# Patient Record
Sex: FEMALE | Race: WHITE | Employment: FULL TIME | URBAN - METROPOLITAN AREA
[De-identification: names, ages, dates, MRNs, and addresses within clinical notes are randomized per-mention and may not be internally consistent; named-entity substitution may affect disease eponyms.]

---

## 2018-10-22 ENCOUNTER — OFFICE VISIT (OUTPATIENT)
Dept: NEUROLOGY | Age: 38
End: 2018-10-22

## 2018-10-22 VITALS
DIASTOLIC BLOOD PRESSURE: 78 MMHG | HEIGHT: 69 IN | OXYGEN SATURATION: 98 % | HEART RATE: 69 BPM | SYSTOLIC BLOOD PRESSURE: 122 MMHG | BODY MASS INDEX: 26.07 KG/M2 | WEIGHT: 176 LBS

## 2018-10-22 DIAGNOSIS — M54.12 CERVICAL RADICULOPATHY: Primary | ICD-10-CM

## 2018-10-22 PROBLEM — R20.0 NUMBNESS OF RIGHT HAND: Chronic | Status: ACTIVE | Noted: 2018-10-22

## 2018-10-22 RX ORDER — LORAZEPAM 1 MG/1
1 TABLET ORAL ONCE
Qty: 2 TAB | Refills: 0 | Status: SHIPPED | OUTPATIENT
Start: 2018-10-22 | End: 2018-10-22

## 2018-10-22 RX ORDER — MIDODRINE HYDROCHLORIDE 5 MG/1
TABLET ORAL 3 TIMES DAILY
COMMUNITY

## 2018-10-22 NOTE — PROGRESS NOTES
NEUROLOGY CLINIC NOTE Patient ID: 
Breanna Gaffney [id number] 
45 y.o. 
1980 Date of Consultation:  October 22, 2018 Referring Physician: Dr. Mayra Campoverde Reason for Consultation:  Right hand numbness and weakness Chief Complaint Patient presents with  New Patient Weakness right hand/numbness History of Present Illness:  
 
Patient Active Problem List  
Diagnosis Code  Numbness of right hand R20.0 Past Medical History:  
Diagnosis Date  Headache  Hypotension  Neurological disorder Past Surgical History:  
Procedure Laterality Date  HX ORTHOPAEDIC    
 bunion removed left foot; tumor removal right shoulder  HX TUBAL LIGATION Prior to Admission medications Medication Sig Start Date End Date Taking? Authorizing Provider  
midodrine (PROAMITINE) 5 mg tablet Take  by mouth three (3) times daily. Yes Provider, Historical  
 
Allergies Allergen Reactions  Oxycodone Hives Social History Socioeconomic History  Marital status:  Spouse name: Not on file  Number of children: Not on file  Years of education: Not on file  Highest education level: Not on file Social Needs  Financial resource strain: Not on file  Food insecurity - worry: Not on file  Food insecurity - inability: Not on file  Transportation needs - medical: Not on file  Transportation needs - non-medical: Not on file Occupational History  Not on file Tobacco Use  Smoking status: Current Some Day Smoker Packs/day: 0.50 Years: 20.00 Pack years: 10.00  Smokeless tobacco: Never Used Substance and Sexual Activity  Alcohol use: No  
  Frequency: Never  Drug use: No  
 Sexual activity: Not on file Other Topics Concern  Not on file Social History Narrative  Not on file Family History Problem Relation Age of Onset  Cancer Mother  Headache Mother  Migraines Mother Subjective: Snaa Samayoa is a 45 y.o. RHWF with history hypotension and is on Midodrine (followed by Dr. Newton Villarreal - cardiologist) who was referred by Dr. Heydi Barney for further evaluation of her right hand issue. Per patient condition is been ongoing for about a year. She was having numbness right index and middle finger numbness that was constant. Not a pain but discomfort. Associated with dropping things she was holding with her right hand. Discomfort would also radiate up to the elbow but nothing in the right upper arm or shoulder. Some neck pain and stiffness. No known precipitant, worsening or relieving factors. She saw Dr. Lady Levin 01/2018 and was given cortisone shots to the right carpal tunnel and nighttime wrist splint. This offered relief of the numbness but not the weakness. It was better for 6 months and then it recurred. She underwent EMG/NCS of the right upper extremity done 9/5/2018 was normal.  No evidence of right carpal tunnel syndrome, ulnar neuropathy, polyneuropathy or radiculopathy. Per patient no needle EMG was done on her cervical paraspinal muscles. Clinic visit with Dr. Ольга Kaur of orthopedics dated 9/6/2018 was reviewed. Note mentions intermittent numbness and tingling of the right hand localized to the dorsal lateral aspect of the hand and elbow tightness for approximately 1 year. 6 months relief after cortisone injection at the right carpal tunnel. Issues mostly during the day. Exam revealed Phalen's test and carpal tunnel compression test was positive on the right. Positive Tinel's on the right wrist.  Diagnosed with carpal tunnel syndrome on the right and tenosynovitis right hand. Patient was told approximately 10% of patients with carpal tunnel syndrome have negative EMG studies. Discussed treatment options and patient does not wish to undergo surgical intervention. Conservative management with nighttime wrist splints was ordered. Decrease index and middle finger right cold and PP Outside reports reviewed: office notes, EMG/NCS. Review of Systems: A comprehensive review of systems was performed:  
Constitutional: positive for fatigue Eyes: positive for none Ears, nose, mouth, throat, and face: positive for snoring Respiratory: positive for none Cardiovascular: positive for skipped beats Gastrointestinal: positive for abdominal pain Genitourinary: positive for none Integument/breast: positive for none Hematologic/lymphatic: positive for none Musculoskeletal: positive for joint pain Neurological: positive for headache, numbness Behavioral/Psych: positive for none Endocrine: positive for none Allergic/Immunologic: positive for none Objective:  
 
Visit Vitals /78 Pulse 69 Ht 5' 9\" (1.753 m) Wt 176 lb (79.8 kg) SpO2 98% BMI 25.99 kg/m² 4/10 hand pain PHYSICAL EXAM: 
 
General Appearance: Alert, patient appears stated age. General:  Well developed, well nourished patent in no apparent distress. Head/Face: The head is normocephalic and atraumatic. Eyes: Conjunctivae appear normal. Sclera appear normal and non-icteric. Nose (and Sinus):   No abnormality of the nose or sinuses is noted. Oral:   Throat is clear. Lymphatics:  No lymphadenopathy in the neck/head. Neck and Thyroid:   No bruits noted in the neck. Respiratory:  Lungs clear to auscultation. Cardiovascular:  Palpation and auscultation: regular rate and rhythm. Extremity: No joint swelling, erythema or pedal edema. NEUROLOGICAL EXAM: 
 
Appearance: The patient is well developed, well nourished, provides a coherent history and is in no acute distress. Mental Status: Oriented to time, place and person. Fluent, no aphasia or dysarthria. Mood and affect appropriate. Cranial Nerves:   Intact visual fields. Fundi are benign. Pupils 4 mm right and 3 mm left and both briskly reactive to light.  EOM's full, no nystagmus, no ptosis. Facial sensation is normal. Corneal reflexes are intact. Facial movement is symmetric. Hearing is normal bilaterally. Palate is midline with normal elevation. Sternocleidomastoid and trapezius muscles are normal. Tongue is midline. Motor:  5/5 strength except mild weakness right hand intrinsic muscles. Normal bulk and tone. No fasciculations. No pronator drift. Reflexes:   Deep tendon reflexes 3+/4 and symmetrical. Downgoing toes. Sensory:   Decrease cold and PP right 2nd and 3rd fingers. (+) Tinel's bilateral wrist and elbow Gait:  Normal gait. No Romberg. Can do tandem walking. Tremor:   No tremor noted. Cerebellar:  Intact FTN/CORTEZ/HTS. Neurovascular:  Normal heart sounds and regular rhythm, peripheral pulses intact, and no carotid bruits. Assessment:  
Cervical radiculopathy Plan:  
Neurological examination reveals decreased sensation right second and third finger with hand intrinsic muscle weakness, Tinel's at both the wrist and elbow, hyperreflexia with downgoing toes. EMG/NCS of the right upper extremity did not reveal any entrapment neuropathy or generalized neuropathy. Need to consider cervical radiculopathy likely at C6 +/- cervical spinal stenosis or myelopathy. Discussed need to do further diagnostic testing. Patient agreed. Cervical MRI without contrast was ordered. Further intervention will be done pending results of testing. No indication to do right carpal tunnel release surgery at this time in my opinion. All questions and concerns were answered. Visit lasted 60 minutes. Greater than greater than 50% was spent discussing her condition, potential etiology, prognosis, review of medical records from outside sources including orthopedic visit and EMG/NCS of right upper extremity, need for cervical MRI to look for cervical radiculopathy or cervical spinal stenosis, no indication to do any carpal tunnel release surgery at this time

## 2018-10-22 NOTE — PATIENT INSTRUCTIONS
A Healthy Lifestyle: Care Instructions Your Care Instructions A healthy lifestyle can help you feel good, stay at a healthy weight, and have plenty of energy for both work and play. A healthy lifestyle is something you can share with your whole family. A healthy lifestyle also can lower your risk for serious health problems, such as high blood pressure, heart disease, and diabetes. You can follow a few steps listed below to improve your health and the health of your family. Follow-up care is a key part of your treatment and safety. Be sure to make and go to all appointments, and call your doctor if you are having problems. It's also a good idea to know your test results and keep a list of the medicines you take. How can you care for yourself at home? · Do not eat too much sugar, fat, or fast foods. You can still have dessert and treats now and then. The goal is moderation. · Start small to improve your eating habits. Pay attention to portion sizes, drink less juice and soda pop, and eat more fruits and vegetables. ? Eat a healthy amount of food. A 3-ounce serving of meat, for example, is about the size of a deck of cards. Fill the rest of your plate with vegetables and whole grains. ? Limit the amount of soda and sports drinks you have every day. Drink more water when you are thirsty. ? Eat at least 5 servings of fruits and vegetables every day. It may seem like a lot, but it is not hard to reach this goal. A serving or helping is 1 piece of fruit, 1 cup of vegetables, or 2 cups of leafy, raw vegetables. Have an apple or some carrot sticks as an afternoon snack instead of a candy bar. Try to have fruits and/or vegetables at every meal. 
· Make exercise part of your daily routine. You may want to start with simple activities, such as walking, bicycling, or slow swimming. Try to be active 30 to 60 minutes every day.  You do not need to do all 30 to 60 minutes all at once. For example, you can exercise 3 times a day for 10 or 20 minutes. Moderate exercise is safe for most people, but it is always a good idea to talk to your doctor before starting an exercise program. 
· Keep moving. Janes Loza the lawn, work in the garden, or [employer]. Take the stairs instead of the elevator at work. · If you smoke, quit. People who smoke have an increased risk for heart attack, stroke, cancer, and other lung illnesses. Quitting is hard, but there are ways to boost your chance of quitting tobacco for good. ? Use nicotine gum, patches, or lozenges. ? Ask your doctor about stop-smoking programs and medicines. ? Keep trying. In addition to reducing your risk of diseases in the future, you will notice some benefits soon after you stop using tobacco. If you have shortness of breath or asthma symptoms, they will likely get better within a few weeks after you quit. · Limit how much alcohol you drink. Moderate amounts of alcohol (up to 2 drinks a day for men, 1 drink a day for women) are okay. But drinking too much can lead to liver problems, high blood pressure, and other health problems. Family health If you have a family, there are many things you can do together to improve your health. · Eat meals together as a family as often as possible. · Eat healthy foods. This includes fruits, vegetables, lean meats and dairy, and whole grains. · Include your family in your fitness plan. Most people think of activities such as jogging or tennis as the way to fitness, but there are many ways you and your family can be more active. Anything that makes you breathe hard and gets your heart pumping is exercise. Here are some tips: 
? Walk to do errands or to take your child to school or the bus. 
? Go for a family bike ride after dinner instead of watching TV. Where can you learn more? Go to http://erica-dayanna.info/. Enter H076 in the search box to learn more about \"A Healthy Lifestyle: Care Instructions. \" Current as of: December 7, 2017 Content Version: 11.8 © 5608-3451 [employer]. Care instructions adapted under license by [employer] (which disclaims liability or warranty for this information). If you have questions about a medical condition or this instruction, always ask your healthcare professional. [address] any warranty or liability for your use of this information. A Healthy Lifestyle: Care Instructions Your Care Instructions A healthy lifestyle can help you feel good, stay at a healthy weight, and have plenty of energy for both work and play. A healthy lifestyle is something you can share with your whole family. A healthy lifestyle also can lower your risk for serious health problems, such as high blood pressure, heart disease, and diabetes. You can follow a few steps listed below to improve your health and the health of your family. Follow-up care is a key part of your treatment and safety. Be sure to make and go to all appointments, and call your doctor if you are having problems. It's also a good idea to know your test results and keep a list of the medicines you take. How can you care for yourself at home? · Do not eat too much sugar, fat, or fast foods. You can still have dessert and treats now and then. The goal is moderation. · Start small to improve your eating habits. Pay attention to portion sizes, drink less juice and soda pop, and eat more fruits and vegetables. ? Eat a healthy amount of food. A 3-ounce serving of meat, for example, is about the size of a deck of cards. Fill the rest of your plate with vegetables and whole grains. ? Limit the amount of soda and sports drinks you have every day. Drink more water when you are thirsty. ? Eat at least 5 servings of fruits and vegetables every day.  It may seem like a lot, but it is not hard to reach this goal. A serving or helping is 1 piece of fruit, 1 cup of vegetables, or 2 cups of leafy, raw vegetables. Have an apple or some carrot sticks as an afternoon snack instead of a candy bar. Try to have fruits and/or vegetables at every meal. 
· Make exercise part of your daily routine. You may want to start with simple activities, such as walking, bicycling, or slow swimming. Try to be active 30 to 60 minutes every day. You do not need to do all 30 to 60 minutes all at once. For example, you can exercise 3 times a day for 10 or 20 minutes. Moderate exercise is safe for most people, but it is always a good idea to talk to your doctor before starting an exercise program. 
· Keep moving. Boris Seamen the lawn, work in the garden, or [employer]. Take the stairs instead of the elevator at work. · If you smoke, quit. People who smoke have an increased risk for heart attack, stroke, cancer, and other lung illnesses. Quitting is hard, but there are ways to boost your chance of quitting tobacco for good. ? Use nicotine gum, patches, or lozenges. ? Ask your doctor about stop-smoking programs and medicines. ? Keep trying. In addition to reducing your risk of diseases in the future, you will notice some benefits soon after you stop using tobacco. If you have shortness of breath or asthma symptoms, they will likely get better within a few weeks after you quit. · Limit how much alcohol you drink. Moderate amounts of alcohol (up to 2 drinks a day for men, 1 drink a day for women) are okay. But drinking too much can lead to liver problems, high blood pressure, and other health problems. Family health If you have a family, there are many things you can do together to improve your health. · Eat meals together as a family as often as possible. · Eat healthy foods. This includes fruits, vegetables, lean meats and dairy, and whole grains. · Include your family in your fitness plan. Most people think of activities such as jogging or tennis as the way to fitness, but there are many ways you and your family can be more active. Anything that makes you breathe hard and gets your heart pumping is exercise. Here are some tips: 
? Walk to do errands or to take your child to school or the bus. 
? Go for a family bike ride after dinner instead of watching TV. Where can you learn more? Go to http://erica-dayanna.info/. Enter T564 in the search box to learn more about \"A Healthy Lifestyle: Care Instructions. \" Current as of: December 7, 2017 Content Version: 11.8 © 4634-6839 [employer]. Care instructions adapted under license by [employer] (which disclaims liability or warranty for this information). If you have questions about a medical condition or this instruction, always ask your healthcare professional. [place] [address] any warranty or liability for your use of this information. Please be advised there is a $25 fee for all paperwork to be completed from our  providers. This is to be paid by the patient prior to picking up the completed forms. Pinched Nerve in the Neck: Care Instructions Your Care Instructions A pinched nerve in the neck happens when a vertebra or disc in the upper part of your spine is damaged. This damage can happen because of an injury. Or it can just happen with age. The changes caused by the damage may put pressure on a nearby nerve root, pinching it. This causes symptoms such as sharp pain in your neck, shoulder, arm, hand, or back. You may also have tingling or numbness. Sometimes it makes your arm weaker. The symptoms are usually worse when you turn your head or strain your neck. For many people, the symptoms get better over time and finally go away. Early treatment usually includes medicines for pain and swelling. Sometimes physical therapy and special exercises may help. Follow-up care is a key part of your treatment and safety. Be sure to make and go to all appointments, and call your doctor if you are having problems. It's also a good idea to know your test results and keep a list of the medicines you take. How can you care for yourself at home? · Be safe with medicines. Read and follow all instructions on the label. ? If the doctor gave you a prescription medicine for pain, take it as prescribed. ? If you are not taking a prescription pain medicine, ask your doctor if you can take an over-the-counter medicine. · Try using a heating pad on a low or medium setting for 15 to 20 minutes every 2 or 3 hours. Try a warm shower in place of one session with the heating pad. You can also buy single-use heat wraps that last up to 8 hours. · You can also try an ice pack for 10 to 15 minutes every 2 to 3 hours. There isn't strong evidence that either heat or ice will help. But you can try them to see if they help you. · Don't spend too long in one position. Take short breaks to move around and change positions. · Wear a seat belt and shoulder harness when you are in a car. · Sleep with a pillow under your head and neck that keeps your neck straight. · If you were given a neck brace (cervical collar) to limit neck motion, wear it as instructed for as many days as your doctor tells you to. Do not wear it longer than you were told to. Wearing a brace for too long can lead to neck stiffness and can weaken the neck muscles. · Follow your doctor's instructions for gentle neck-stretching exercises. · Do not smoke. Smoking can slow healing of your discs. If you need help quitting, talk to your doctor about stop-smoking programs and medicines. These can increase your chances of quitting for good. · Avoid strenuous work or exercise until your doctor says it is okay. When should you call for help? Call 911 anytime you think you may need emergency care. For example, call if: 
  · You are unable to move an arm or a leg at all.  
[facility] your doctor now or seek immediate medical care if: 
  · You have new or worse symptoms in your arms, legs, chest, belly, or buttocks. Symptoms may include: 
? Numbness or tingling. ? Weakness. ? Pain.  
  · You lose bladder or bowel control.  
 Watch closely for changes in your health, and be sure to contact your doctor if: 
  · You are not getting better as expected. Where can you learn more? Go to http://erica-dayanna.info/. Enter U724 in the search box to learn more about \"Pinched Nerve in the Neck: Care Instructions. \" Current as of: November 29, 2017 Content Version: 11.8 © 4855-5457 [employer]. Care instructions adapted under license by [employer] (which disclaims liability or warranty for this information). If you have questions about a medical condition or this instruction, always ask your healthcare professional. [place] [address] any warranty or liability for your use of this information.

## 2018-11-21 ENCOUNTER — HOSPITAL ENCOUNTER (OUTPATIENT)
Dept: MRI IMAGING | Age: 38
Discharge: HOME | End: 2018-11-21
Attending: PSYCHIATRY & NEUROLOGY
Payer: COMMERCIAL

## 2018-11-21 DIAGNOSIS — M54.12 CERVICAL RADICULOPATHY: ICD-10-CM

## 2018-11-21 PROCEDURE — 72141 MRI NECK SPINE W/O DYE: CPT

## 2018-11-29 ENCOUNTER — OFFICE VISIT (OUTPATIENT)
Dept: NEUROLOGY | Age: 38
End: 2018-11-29

## 2018-11-29 VITALS
OXYGEN SATURATION: 97 % | DIASTOLIC BLOOD PRESSURE: 90 MMHG | HEART RATE: 67 BPM | HEIGHT: 69 IN | SYSTOLIC BLOOD PRESSURE: 110 MMHG | BODY MASS INDEX: 26.22 KG/M2 | WEIGHT: 177 LBS

## 2018-11-29 DIAGNOSIS — M54.2 CERVICALGIA: Primary | ICD-10-CM

## 2018-11-29 DIAGNOSIS — R20.2 RIGHT HAND PARESTHESIA: ICD-10-CM

## 2018-11-29 NOTE — PATIENT INSTRUCTIONS
Office Policies  o Phone calls/patient messages:  Please allow up to 24 hours for someone in the office to contact you about your call or message. Be mindful your provider may be out of the office or your message may require further review. We encourage you to use [employer] for your messages as this is a faster, more efficient way to communicate with our office  o Medication Refills:  Prescription medications require up to 48 business hours to process. We encourage you to use [employer] for your refills. For controlled medications: Please allow up to 72 business hours to process. Certain medications may require you to  a written prescription at our office. NO narcotic/controlled medications will be prescribed after 4pm Monday through Friday or on weekends  o Form/Paperwork Completion:  Please note there is a $25 fee for all paperwork completed by our providers. We ask that you allow 7-14 business days. Pre-payment is due prior to picking up/faxing the completed form. You may also download your forms to [employer] to have your doctor print off.  o Test Results: In order for a patient to obtain test results, an appointment must be made with the physician to review the results. Test results cannot be discussed over the phone. A Healthy Lifestyle: Care Instructions  Your Care Instructions    A healthy lifestyle can help you feel good, stay at a healthy weight, and have plenty of energy for both work and play. A healthy lifestyle is something you can share with your whole family. A healthy lifestyle also can lower your risk for serious health problems, such as high blood pressure, heart disease, and diabetes. You can follow a few steps listed below to improve your health and the health of your family. Follow-up care is a key part of your treatment and safety. Be sure to make and go to all appointments, and call your doctor if you are having problems.  It's also a good idea to know your test results and keep a list of the medicines you take. How can you care for yourself at home? · Do not eat too much sugar, fat, or fast foods. You can still have dessert and treats now and then. The goal is moderation. · Start small to improve your eating habits. Pay attention to portion sizes, drink less juice and soda pop, and eat more fruits and vegetables. ? Eat a healthy amount of food. A 3-ounce serving of meat, for example, is about the size of a deck of cards. Fill the rest of your plate with vegetables and whole grains. ? Limit the amount of soda and sports drinks you have every day. Drink more water when you are thirsty. ? Eat at least 5 servings of fruits and vegetables every day. It may seem like a lot, but it is not hard to reach this goal. A serving or helping is 1 piece of fruit, 1 cup of vegetables, or 2 cups of leafy, raw vegetables. Have an apple or some carrot sticks as an afternoon snack instead of a candy bar. Try to have fruits and/or vegetables at every meal.  · Make exercise part of your daily routine. You may want to start with simple activities, such as walking, bicycling, or slow swimming. Try to be active 30 to 60 minutes every day. You do not need to do all 30 to 60 minutes all at once. For example, you can exercise 3 times a day for 10 or 20 minutes. Moderate exercise is safe for most people, but it is always a good idea to talk to your doctor before starting an exercise program.  · Keep moving. [place] the lawn, work in the garden, or [employer]. Take the stairs instead of the elevator at work. · If you smoke, quit. People who smoke have an increased risk for heart attack, stroke, cancer, and other lung illnesses. Quitting is hard, but there are ways to boost your chance of quitting tobacco for good. ? Use nicotine gum, patches, or lozenges. ? Ask your doctor about stop-smoking programs and medicines. ? Keep trying.   In addition to reducing your risk of diseases in the future, you will notice some benefits soon after you stop using tobacco. If you have shortness of breath or asthma symptoms, they will likely get better within a few weeks after you quit. · Limit how much alcohol you drink. Moderate amounts of alcohol (up to 2 drinks a day for men, 1 drink a day for women) are okay. But drinking too much can lead to liver problems, high blood pressure, and other health problems. Family health  If you have a family, there are many things you can do together to improve your health. · Eat meals together as a family as often as possible. · Eat healthy foods. This includes fruits, vegetables, lean meats and dairy, and whole grains. · Include your family in your fitness plan. Most people think of activities such as jogging or tennis as the way to fitness, but there are many ways you and your family can be more active. Anything that makes you breathe hard and gets your heart pumping is exercise. Here are some tips:  ? Walk to do errands or to take your child to school or the bus.  ? Go for a family bike ride after dinner instead of watching TV. Where can you learn more? Go to http://ericaSMS GupShupdayanna.info/. Enter D465 in the search box to learn more about \"A Healthy Lifestyle: Care Instructions. \"  Current as of: December 7, 2017  Content Version: 11.8  © 1391-7264 [employer]. Care instructions adapted under license by [employer] (which disclaims liability or warranty for this information). If you have questions about a medical condition or this instruction, always ask your healthcare professional. [place] [address] any warranty or liability for your use of this information. Neck: Exercises  Your Care Instructions  Here are some examples of typical rehabilitation exercises for your condition. Start each exercise slowly. Ease off the exercise if you start to have pain.   Your doctor or physical therapist will tell you when you can start these exercises and which ones will work best for you. How to do the exercises  Neck stretch    1. This stretch works best if you keep your shoulder down as you lean away from it. To help you remember to do this, start by relaxing your shoulders and lightly holding on to your thighs or your chair. 2. Tilt your head toward your shoulder and hold for 15 to 30 seconds. Let the weight of your head stretch your muscles. 3. If you would like a little added stretch, use your hand to gently and steadily pull your head toward your shoulder. For example, keeping your right shoulder down, lean your head to the left. 4. Repeat 2 to 4 times toward each shoulder. Diagonal neck stretch    1. Turn your head slightly toward the direction you will be stretching, and tilt your head diagonally toward your chest and hold for 15 to 30 seconds. 2. If you would like a little added stretch, use your hand to gently and steadily pull your head forward on the diagonal.  3. Repeat 2 to 4 times toward each side. Dorsal glide stretch    1. Sit or stand tall and look straight ahead. 2. Slowly tuck your chin as you glide your head backward over your body  3. Hold for a count of 6, and then relax for up to 10 seconds. 4. Repeat 8 to 12 times. Chest and shoulder stretch    1. Sit or stand tall and glide your head backward as in the dorsal glide stretch. 2. Raise both arms so that your hands are next to your ears. 3. Take a deep breath, and as you breathe out, lower your elbows down and behind your back. You will feel your shoulder blades slide down and together, and at the same time you will feel a stretch across your chest and the front of your shoulders. 4. Hold for about 6 seconds, and then relax for up to 10 seconds. 5. Repeat 8 to 12 times. Strengthening: Hands on head    1. Move your head backward, forward, and side to side against gentle pressure from your hands, holding each position for about 6 seconds.   2. Repeat 8 to 12 times.    Follow-up care is a key part of your treatment and safety. Be sure to make and go to all appointments, and call your doctor if you are having problems. It's also a good idea to know your test results and keep a list of the medicines you take. Where can you learn more? Go to http://erica-dayanna.info/. Enter P975 in the search box to learn more about \"Neck: Exercises. \"  Current as of: November 29, 2017  Content Version: 11.8  © 3587-8385 [employer]. Care instructions adapted under license by [employer] (which disclaims liability or warranty for this information). If you have questions about a medical condition or this instruction, always ask your healthcare professional. [address] any warranty or liability for your use of this information.

## 2018-11-29 NOTE — PROGRESS NOTES
NEUROLOGY CLINIC NOTE    Patient ID:  Sana Samayoa  [id number]  77 y.o.  1980    Date of Visit:  November 29, 2018    Referring Physician: Dr. Heydi Barney    Reason for Visit:  Right hand numbness and weakness    Chief Complaint   Patient presents with    Follow-up      cervical radiculapthy       History of Present Illness:     Patient Active Problem List   Diagnosis Code    Numbness of right hand R20.0     Past Medical History:   Diagnosis Date    Headache     Hypotension     Neurological disorder      Past Surgical History:   Procedure Laterality Date    HX ORTHOPAEDIC      bunion removed left foot; tumor removal right shoulder    HX TUBAL LIGATION         Prior to Admission medications    Medication Sig Start Date End Date Taking? Authorizing Provider   midodrine (PROAMITINE) 5 mg tablet Take  by mouth three (3) times daily.    Yes Provider, Historical     Allergies   Allergen Reactions    Hydrocodone Hives    Oxycodone Hives and Shortness of Breath     Social History     Socioeconomic History    Marital status:      Spouse name: Not on file    Number of children: Not on file    Years of education: Not on file    Highest education level: Not on file   Social Needs    Financial resource strain: Not on file    Food insecurity - worry: Not on file    Food insecurity - inability: Not on file    Transportation needs - medical: Not on file   [place] needs - non-medical: Not on file   Occupational History    Not on file   Tobacco Use    Smoking status: Current Some Day Smoker     Packs/day: 0.50     Years: 20.00     Pack years: 10.00    Smokeless tobacco: Never Used   Substance and Sexual Activity    Alcohol use: No     Frequency: Never    Drug use: No    Sexual activity: Not on file   Other Topics Concern    Not on file   Social History Narrative    Not on file        Family History   Problem Relation Age of Onset    Cancer Mother     Headache Mother    [facility] Migraines Mother Subjective:      Mariposa Adams is a 45 y.o. RHWF with history hypotension and is on Midodrine (followed by Dr. Wil Goode - cardiologist) who was referred by Dr. Phan Valdez for further evaluation of her right hand issue. Per patient condition is been ongoing for about a year. She was having numbness right index and middle finger numbness that was constant. Not a pain but discomfort. Associated with dropping things she was holding with her right hand. Discomfort would also radiate up to the elbow but nothing in the right upper arm or shoulder. Some neck pain and stiffness. No known precipitant, worsening or relieving factors. She saw Dr. Tanika Reed 01/2018 and was given cortisone shots to the right carpal tunnel and nighttime wrist splint. This offered relief of the numbness but not the weakness. It was better for 6 months and then it recurred. She underwent EMG/NCS of the right upper extremity done 9/5/2018 was normal.  No evidence of right carpal tunnel syndrome, ulnar neuropathy, polyneuropathy or radiculopathy. Per patient no needle EMG was done on her cervical paraspinal muscles. Clinic visit with Dr. Carlos Sims of orthopedics dated 9/6/2018 was reviewed. Note mentions intermittent numbness and tingling of the right hand localized to the dorsal lateral aspect of the hand and elbow tightness for approximately 1 year. 6 months relief after cortisone injection at the right carpal tunnel. Issues mostly during the day. Exam revealed Phalen's test and carpal tunnel compression test was positive on the right. Positive Tinel's on the right wrist.  Diagnosed with carpal tunnel syndrome on the right and tenosynovitis right hand. Patient was told approximately 10% of patients with carpal tunnel syndrome have negative EMG studies. Discussed treatment options and patient does not wish to undergo surgical intervention. Conservative management with nighttime wrist splints was ordered.     Since the last visit on 10/22/2018, patient underwent cervical MRI without contrast 11/21/2018 which revealed C5-6 small central bulge/protrusion with minimal stenosis. C3-4 and C6-7 minimal disc bulge with no stenosis. Per patient condition continues to persist.  Patient reports mild right neck pain for the past several weeks. Feels like she slept the wrong way. Outside reports reviewed: radiology    Review of Systems:    A comprehensive review of systems was performed:   Musculoskeletal: positive for joint pain   Neurological: positive for numbness       Objective:     Visit Vitals  /90   Pulse 67   Ht 5' 9\" (1.753 m)   Wt 177 lb (80.3 kg)   SpO2 97%   BMI 26.14 kg/m²     5/10 generalized pain    PHYSICAL EXAM:    NEUROLOGICAL EXAM:    Appearance: The patient is well developed, well nourished, provides a coherent history and is in no acute distress. Mental Status: Oriented to time, place and person. Fluent, no aphasia or dysarthria. Mood and affect appropriate. Cranial Nerves:   II - XII were intact. Motor:  5/5 strength except mild weakness right hand intrinsic muscles. Normal bulk and tone. No fasciculations. No pronator drift. Reflexes:   Deep tendon reflexes 3+/4 and symmetrical. Downgoing toes. Sensory:   Decrease cold and PP right 2nd and 3rd fingers. (+) Tinel's bilateral wrist and elbow    Gait:  Normal gait. No Romberg. Can do tandem walking. Tremor:   No tremor noted. Cerebellar:  Intact FTN/CORTEZ/HTS. Assessment:   Right hand paresthesia  Cervicalgia    Plan:   Neurological examination is unchanged. It reveals decreased sensation right second and third finger with hand intrinsic muscle weakness, Tinel's at both the wrist and elbow, hyperreflexia with downgoing toes. EMG/NCS of the right upper extremity did not reveal any entrapment neuropathy or generalized neuropathy. Cervical MRI without contrast was underwhelming. No significant spinal stenosis or radiculopathy.   Patient's response to injection at that carpal tunnel with relief for 6 months makes it likely patient is having clinical carpal tunnel syndrome which is not enough to be severe to be seen by EMG/NCS. Discussed need to do further diagnostic testing. Patient agreed. Patient referred to [address] [place] [address] neurology care of Dr. Breanna Anderson for ultrasound testing to further confirm whether patient truly has carpal tunnel syndrome or not. If confirmed then patient wishes to have another injection treatment as this provided 6 months relief for her. No indication to do right carpal tunnel release surgery at this time in my opinion. Patient currently complaining of mild right-sided neck pain. Likely more mechanical currently. May take OTC medication for relief. Cervical MRI without contrast did not reveal any significant spondylosis, myelopathy or radiculopathy. All questions and concerns were answered.

## 2018-12-06 ENCOUNTER — DOCUMENTATION ONLY (OUTPATIENT)
Dept: NEUROLOGY | Age: 38
End: 2018-12-06